# Patient Record
Sex: MALE | Race: WHITE
[De-identification: names, ages, dates, MRNs, and addresses within clinical notes are randomized per-mention and may not be internally consistent; named-entity substitution may affect disease eponyms.]

---

## 2021-04-22 ENCOUNTER — APPOINTMENT (OUTPATIENT)
Dept: CARDIOLOGY | Facility: CLINIC | Age: 61
End: 2021-04-22

## 2021-06-10 ENCOUNTER — RX RENEWAL (OUTPATIENT)
Age: 61
End: 2021-06-10

## 2022-04-21 ENCOUNTER — NON-APPOINTMENT (OUTPATIENT)
Age: 62
End: 2022-04-21

## 2022-04-21 ENCOUNTER — APPOINTMENT (OUTPATIENT)
Dept: CARDIOLOGY | Facility: CLINIC | Age: 62
End: 2022-04-21
Payer: COMMERCIAL

## 2022-04-21 VITALS
TEMPERATURE: 98.4 F | BODY MASS INDEX: 44.43 KG/M2 | HEIGHT: 69 IN | WEIGHT: 300 LBS | HEART RATE: 72 BPM | SYSTOLIC BLOOD PRESSURE: 148 MMHG | DIASTOLIC BLOOD PRESSURE: 70 MMHG | OXYGEN SATURATION: 95 %

## 2022-04-21 DIAGNOSIS — Z78.9 OTHER SPECIFIED HEALTH STATUS: ICD-10-CM

## 2022-04-21 DIAGNOSIS — Z82.49 FAMILY HISTORY OF ISCHEMIC HEART DISEASE AND OTHER DISEASES OF THE CIRCULATORY SYSTEM: ICD-10-CM

## 2022-04-21 DIAGNOSIS — Z72.3 LACK OF PHYSICAL EXERCISE: ICD-10-CM

## 2022-04-21 DIAGNOSIS — Z87.891 PERSONAL HISTORY OF NICOTINE DEPENDENCE: ICD-10-CM

## 2022-04-21 DIAGNOSIS — Z80.41 FAMILY HISTORY OF MALIGNANT NEOPLASM OF OVARY: ICD-10-CM

## 2022-04-21 DIAGNOSIS — Z86.2 PERSONAL HISTORY OF DISEASES OF THE BLOOD AND BLOOD-FORMING ORGANS AND CERTAIN DISORDERS INVOLVING THE IMMUNE MECHANISM: ICD-10-CM

## 2022-04-21 PROCEDURE — 99204 OFFICE O/P NEW MOD 45 MIN: CPT

## 2022-04-21 RX ORDER — METFORMIN HYDROCHLORIDE 1000 MG/1
1000 TABLET, COATED ORAL
Qty: 180 | Refills: 0 | Status: ACTIVE | COMMUNITY
Start: 2022-04-21

## 2022-04-21 RX ORDER — ASPIRIN ENTERIC COATED TABLETS 81 MG 81 MG/1
81 TABLET, DELAYED RELEASE ORAL DAILY
Qty: 30 | Refills: 0 | Status: ACTIVE | COMMUNITY
Start: 2022-04-21

## 2022-04-21 RX ORDER — CLOPIDOGREL BISULFATE 75 MG/1
75 TABLET, FILM COATED ORAL DAILY
Qty: 90 | Refills: 3 | Status: ACTIVE | COMMUNITY
Start: 2022-04-21

## 2022-04-21 RX ORDER — FLUTICASONE PROPIONATE AND SALMETEROL 50; 100 UG/1; UG/1
100-50 POWDER RESPIRATORY (INHALATION) TWICE DAILY
Refills: 0 | Status: ACTIVE | COMMUNITY
Start: 2022-04-21

## 2022-04-21 NOTE — DISCUSSION/SUMMARY
[FreeTextEntry1] : Patient with coronary atherosclerosis, diabetes, morbid obesity, hypertension, dyslipidemia ; with moderate left sided chest pain few days ago.  His EKG has sinus rhythm, flipped T wave in lead III.  The patient is unable to exercise vigorously given his morbid obesity: Further evaluation of his left-sided chest pain with nuclear stress test should be performed. \par \par Dyspnea on exertion, sleep apnea: Check echocardiogram.  Evaluation of pulmonary pressures should be important.\par \par Hypertension.  The patient does not want to increase metoprolol at this time.  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  Target LDL and A1c was also discussed.\par \par We will follow him after the above test results: \par \par Thank you for this referral and allowing me to participate in the care of this patient.  If I can be of any further help or  if you have any questions, please do not hesitate to contact me\par \par \par Sincerely,\par \par Edwin Gonzalez MD, FACC, [facility]

## 2022-04-21 NOTE — PHYSICAL EXAM
[Well Developed] : well developed [Well Nourished] : well nourished [No Acute Distress] : no acute distress [Normal Conjunctiva] : normal conjunctiva [Normal Venous Pressure] : normal venous pressure [No Carotid Bruit] : no carotid bruit [Normal S1, S2] : normal S1, S2 [No Murmur] : no murmur [No Rub] : no rub [No Gallop] : no gallop [Clear Lung Fields] : clear lung fields [Good Air Entry] : good air entry [No Respiratory Distress] : no respiratory distress  [Soft] : abdomen soft [Non Tender] : non-tender [Normal Gait] : normal gait [No Cyanosis] : no cyanosis [No Clubbing] : no clubbing [No Varicosities] : no varicosities [No Rash] : no rash [No Skin Lesions] : no skin lesions [Moves all extremities] : moves all extremities [No Focal Deficits] : no focal deficits [Alert and Oriented] : alert and oriented [Normal Speech] : normal speech [Normal memory] : normal memory [de-identified] : Uncle obesity [de-identified] : Subcutaneous fat.

## 2022-04-21 NOTE — REASON FOR VISIT
[FreeTextEntry1] : Danielito is a 61-year-old male with history of hypertension, dyslipidemia, morbid obesity, diabetes, obstructive sleep apnea on CPAP.\par \par On 4/16/2022, he had left upper chest discomfort which was sharp in character, localized, 5/10 in intensity, started spontaneously and then resolved.  He was seen in the ER in [place] and a CTA of coronaries was performed and he was informed that he had about 50% blockages.  Cardiac cath or PCI was not pursued.  Since then, he has returned from [place] and has not had any further cardiac symptoms.\par \par He has morbid obesity, obstructive sleep apnea, mild dyspnea on exertion which has been stable and unchanged over the years, no PND or orthopnea, no palpitations or syncope.\par \par In 2016 he had cardiac cath which was nonobstructive and was not pursued with any intervention.\par \par Blood pressures are not ideally controlled today.  The patient feels that he started metoprolol only few days ago and may not have had its full impact

## 2022-04-26 ENCOUNTER — APPOINTMENT (OUTPATIENT)
Dept: CARDIOLOGY | Facility: CLINIC | Age: 62
End: 2022-04-26
Payer: COMMERCIAL

## 2022-04-26 PROCEDURE — 93306 TTE W/DOPPLER COMPLETE: CPT

## 2022-04-26 PROCEDURE — 93015 CV STRESS TEST SUPVJ I&R: CPT

## 2022-04-26 PROCEDURE — A9502: CPT

## 2022-04-26 PROCEDURE — ZZZZZ: CPT

## 2022-04-26 PROCEDURE — 78452 HT MUSCLE IMAGE SPECT MULT: CPT

## 2022-04-27 ENCOUNTER — APPOINTMENT (OUTPATIENT)
Dept: CARDIOLOGY | Facility: CLINIC | Age: 62
End: 2022-04-27
Payer: COMMERCIAL

## 2022-04-27 VITALS
HEIGHT: 69 IN | TEMPERATURE: 98 F | DIASTOLIC BLOOD PRESSURE: 72 MMHG | WEIGHT: 299 LBS | BODY MASS INDEX: 44.28 KG/M2 | OXYGEN SATURATION: 97 % | SYSTOLIC BLOOD PRESSURE: 148 MMHG | HEART RATE: 72 BPM

## 2022-04-27 PROCEDURE — 99214 OFFICE O/P EST MOD 30 MIN: CPT

## 2022-04-27 RX ORDER — METOPROLOL TARTRATE 25 MG/1
25 TABLET, FILM COATED ORAL TWICE DAILY
Qty: 180 | Refills: 1 | Status: ACTIVE | COMMUNITY
Start: 2022-04-21 | End: 1900-01-01

## 2022-04-27 NOTE — DISCUSSION/SUMMARY
[FreeTextEntry1] : Patient with noncritical coronary atherosclerosis, diabetes, morbid obesity, hypertension, dyslipidemia ; with moderate left sided chest pain few days ago.  \par \par I discussed echocardiogram and nuclear stress test with SPECT perfusion imaging findings in detail with him.\par \par Dyspnea on exertion, sleep apnea: Most likely from physical conditioning and obesity.  LV systolic and diastolic function as well as PASP are normal\par \par Hypertension.  Increase metoprolol to 25 mg twice daily\par \par Continue dual antiplatelet treatment and diabetes medications as well as rosuvastatin without changes\par \par \par Thank you for this referral and allowing me to participate in the care of this patient.  If I can be of any further help or  if you have any questions, please do not hesitate to contact me\par \par \par Sincerely,\par \par Edwin Gonzalez MD, FACC, [place]

## 2022-04-27 NOTE — PHYSICAL EXAM
[Well Developed] : well developed [Well Nourished] : well nourished [No Acute Distress] : no acute distress [Normal Conjunctiva] : normal conjunctiva [Normal Venous Pressure] : normal venous pressure [No Carotid Bruit] : no carotid bruit [Normal S1, S2] : normal S1, S2 [No Murmur] : no murmur [No Rub] : no rub [No Gallop] : no gallop [Clear Lung Fields] : clear lung fields [Good Air Entry] : good air entry [No Respiratory Distress] : no respiratory distress  [Soft] : abdomen soft [Non Tender] : non-tender [Normal Gait] : normal gait [No Cyanosis] : no cyanosis [No Clubbing] : no clubbing [No Varicosities] : no varicosities [No Rash] : no rash [No Skin Lesions] : no skin lesions [Moves all extremities] : moves all extremities [No Focal Deficits] : no focal deficits [Normal Speech] : normal speech [Alert and Oriented] : alert and oriented [Normal memory] : normal memory [de-identified] : Uncle obesity [de-identified] : Subcutaneous fat.

## 2022-04-27 NOTE — REASON FOR VISIT
[FreeTextEntry1] : Danielito is a 61-year-old male with history of hypertension, dyslipidemia, morbid obesity, diabetes, obstructive sleep apnea on CPAP.\par \par On 4/16/2022, he had left upper chest discomfort which was sharp in character, localized, 5/10 in intensity, started spontaneously and then resolved.  He was seen in the ER in [place] and a CTA of coronaries was performed and he was informed that he had less than  50% narrowing in proximal LAD -which was confirmed by obtaining the CTA report from [place].  Cardiac cath or PCI was not pursued.\par \par He had echo and nuclear stress testing here.  LV function was normal.  SPECT scan did not show any ischemia or significant fixed defects.  He no longer has any chest pain\par \par He has morbid obesity, obstructive sleep apnea, mild dyspnea on exertion which has been stable and unchanged over the years, no PND or orthopnea, no palpitations or syncope.\par \par In 2016 he had cardiac cath which was nonobstructive and was not pursued with any intervention.\par \par Blood pressures are not ideally controlled today.  He has not been taking metoprolol twice daily.  He takes it once a day only.

## 2022-04-28 ENCOUNTER — APPOINTMENT (OUTPATIENT)
Dept: CARDIOLOGY | Facility: CLINIC | Age: 62
End: 2022-04-28

## 2022-10-24 ENCOUNTER — APPOINTMENT (OUTPATIENT)
Dept: CARDIOLOGY | Facility: CLINIC | Age: 62
End: 2022-10-24

## 2022-10-24 VITALS
RESPIRATION RATE: 14 BRPM | SYSTOLIC BLOOD PRESSURE: 120 MMHG | TEMPERATURE: 97.1 F | BODY MASS INDEX: 45.47 KG/M2 | WEIGHT: 307 LBS | OXYGEN SATURATION: 96 % | HEIGHT: 69 IN | HEART RATE: 63 BPM | DIASTOLIC BLOOD PRESSURE: 68 MMHG

## 2022-10-24 PROCEDURE — 99214 OFFICE O/P EST MOD 30 MIN: CPT

## 2022-10-24 RX ORDER — FAMOTIDINE 40 MG/1
40 TABLET, FILM COATED ORAL
Qty: 90 | Refills: 0 | Status: ACTIVE | COMMUNITY
Start: 2022-07-25

## 2022-10-24 RX ORDER — ROSUVASTATIN CALCIUM 5 MG/1
5 TABLET, FILM COATED ORAL
Qty: 90 | Refills: 0 | Status: ACTIVE | COMMUNITY
Start: 2022-08-30

## 2022-10-24 RX ORDER — FLUTICASONE PROPIONATE 50 UG/1
50 SPRAY, METERED NASAL
Qty: 16 | Refills: 0 | Status: ACTIVE | COMMUNITY
Start: 2022-08-30

## 2022-10-24 RX ORDER — ROSUVASTATIN CALCIUM 10 MG/1
10 TABLET, FILM COATED ORAL
Qty: 90 | Refills: 1 | Status: DISCONTINUED | COMMUNITY
Start: 2022-04-21 | End: 2022-10-24

## 2022-10-24 NOTE — REASON FOR VISIT
[FreeTextEntry1] : Danielito is a 61-year-old male with history of hypertension, dyslipidemia, morbid obesity, diabetes, obstructive sleep apnea on CPAP.\par \par Atypical chest pain in April 2022 and CT of coronaries showed he had less than  50% narrowing in proximal LAD.  Cardiac cath or PCI was not pursued.\par \par He had echo and nuclear stress testing here.  LV function was normal.  SPECT scan did not show any ischemia or significant fixed defects.  He no longer has any chest pain\par \par He has morbid obesity, obstructive sleep apnea, mild dyspnea on exertion which has been stable and unchanged over the years, no PND or orthopnea, no palpitations or syncope.\par \par In 2016 he had cardiac cath which was nonobstructive and was not pursued with any intervention.\par \par Blood pressures are controlled today.  LDL was 58 in April 2022

## 2022-10-24 NOTE — DISCUSSION/SUMMARY
[FreeTextEntry1] : Patient with noncritical coronary atherosclerosis, diabetes, morbid obesity, hypertension, dyslipidemia ; with moderate left sided chest pain few days ago.  \par \par Lipidemia: On low-dose Crestor, LDL is 58.  In addition, dietary changes and exercise program would be helpful which was discussed in detail\par \par Dyspnea on exertion, sleep apnea: Most likely from physical conditioning and obesity.  LV systolic and diastolic function as well as PASP are normal\par \par Hypertension.  Continue current medications\par \par Continue dual antiplatelet treatment and diabetes medications as well as rosuvastatin without changes\par \par \par Thank you for this referral and allowing me to participate in the care of this patient.  If I can be of any further help or  if you have any questions, please do not hesitate to contact me\par \par \par Sincerely,\par \par Edwin Gonzalez MD, FACC, [place]

## 2023-01-15 ENCOUNTER — NON-APPOINTMENT (OUTPATIENT)
Age: 63
End: 2023-01-15

## 2023-04-13 ENCOUNTER — APPOINTMENT (OUTPATIENT)
Dept: CARDIOLOGY | Facility: CLINIC | Age: 63
End: 2023-04-13

## 2023-04-27 ENCOUNTER — NON-APPOINTMENT (OUTPATIENT)
Age: 63
End: 2023-04-27

## 2023-04-27 ENCOUNTER — APPOINTMENT (OUTPATIENT)
Dept: CARDIOLOGY | Facility: CLINIC | Age: 63
End: 2023-04-27
Payer: COMMERCIAL

## 2023-04-27 VITALS
HEART RATE: 59 BPM | OXYGEN SATURATION: 96 % | WEIGHT: 309 LBS | HEIGHT: 69 IN | BODY MASS INDEX: 45.77 KG/M2 | SYSTOLIC BLOOD PRESSURE: 110 MMHG | DIASTOLIC BLOOD PRESSURE: 68 MMHG

## 2023-04-27 DIAGNOSIS — Z87.898 PERSONAL HISTORY OF OTHER SPECIFIED CONDITIONS: ICD-10-CM

## 2023-04-27 PROCEDURE — 99214 OFFICE O/P EST MOD 30 MIN: CPT

## 2023-04-27 RX ORDER — LINAGLIPTIN 5 MG/1
5 TABLET, FILM COATED ORAL DAILY
Refills: 0 | Status: DISCONTINUED | COMMUNITY
Start: 2022-04-21 | End: 2023-04-27

## 2023-04-27 NOTE — PHYSICAL EXAM
[Well Developed] : well developed [Well Nourished] : well nourished [No Acute Distress] : no acute distress [Normal Conjunctiva] : normal conjunctiva [Normal Venous Pressure] : normal venous pressure [No Carotid Bruit] : no carotid bruit [Normal S1, S2] : normal S1, S2 [No Murmur] : no murmur [No Rub] : no rub [No Gallop] : no gallop [Clear Lung Fields] : clear lung fields [Good Air Entry] : good air entry [No Respiratory Distress] : no respiratory distress  [Soft] : abdomen soft [Non Tender] : non-tender [Normal Gait] : normal gait [No Cyanosis] : no cyanosis [No Clubbing] : no clubbing [No Varicosities] : no varicosities [No Rash] : no rash [No Skin Lesions] : no skin lesions [Moves all extremities] : moves all extremities [No Focal Deficits] : no focal deficits [Normal Speech] : normal speech [Alert and Oriented] : alert and oriented [Normal memory] : normal memory [de-identified] : Uncle obesity [de-identified] : Subcutaneous fat.

## 2023-04-27 NOTE — DISCUSSION/SUMMARY
[FreeTextEntry1] : Patient with noncritical coronary atherosclerosis, diabetes, morbid obesity, hypertension, dyslipidemia ; no chest pain.  EKG today with unremarkable ST and T\par \par Hyper lipidemia: On low-dose Crestor, LDL is 65.  In addition, dietary changes and exercise program would be helpful which was discussed in detail\par \par Dyspnea on exertion, sleep apnea: Most likely from physical conditioning and obesity.  LV systolic and diastolic function as well as PASP are normal\par \par Hypertension.  Controlled and continue current medications\par \par Continue dual antiplatelet treatment and diabetes medications as well as rosuvastatin without changes\par \par Morbid obesity with uncontrolled diabetes.  A1c 7.7.  Stop Tradjenta.  Try Ozempic.  Weight loss may have multiple benefits.  Risk and benefits discussed.  The patient will try it for 2 weeks and check labs.  Follow-up after.  Increase the dosage of Ozempic depending upon his response.\par \par \par Thank you for this referral and allowing me to participate in the care of this patient.  If I can be of any further help or  if you have any questions, please do not hesitate to contact me\par \par \par Sincerely,\par \par Edwin Gonzalez MD, FACC, [place]

## 2023-04-27 NOTE — REASON FOR VISIT
[FreeTextEntry1] : Danielito is a 62-year-old male with history of hypertension, dyslipidemia, morbid obesity, diabetes, obstructive sleep apnea on CPAP.\par \par Coronary atherosclerosis: CT of coronaries showed he had less than  50% narrowing in proximal LAD.  Cardiac cath or PCI was not pursued.\par \par He had echo and nuclear stress testing here.  LV function was normal.  SPECT scan did not show any ischemia or significant fixed defects.  He no longer has any chest pain\par \par He has morbid obesity, obstructive sleep apnea, mild dyspnea on exertion which has been stable and unchanged over the years, no PND or orthopnea, no palpitations or syncope.\par \par In 2016 he had cardiac cath which was nonobstructive and was not pursued with any intervention.\par \par Blood pressures are controlled today.  LDL was 58 in April 2022\par \par March 2023 Labs: Showed A1c of 7.7 and FBS of 156

## 2023-05-08 ENCOUNTER — APPOINTMENT (OUTPATIENT)
Dept: CARDIOLOGY | Facility: CLINIC | Age: 63
End: 2023-05-08
Payer: COMMERCIAL

## 2023-05-08 VITALS
WEIGHT: 304 LBS | BODY MASS INDEX: 45.03 KG/M2 | HEIGHT: 69 IN | HEART RATE: 96 BPM | OXYGEN SATURATION: 81 % | SYSTOLIC BLOOD PRESSURE: 126 MMHG | DIASTOLIC BLOOD PRESSURE: 60 MMHG

## 2023-05-08 PROCEDURE — 99214 OFFICE O/P EST MOD 30 MIN: CPT

## 2023-05-08 NOTE — HISTORY OF PRESENT ILLNESS
[FreeTextEntry1] : SVETLANA FARMER is a 62 year old male with a past medical history of:\par \par  hypertension, dyslipidemia, morbid obesity, diabetes, obstructive sleep apnea on CPAP.\par \par Coronary atherosclerosis: CT of coronaries 4/15/22 showed he had less than 50% narrowing in proximal LAD. Cardiac cath or PCI was not pursued. Calcified plaque also present in the left main coronary artery. EF 68%. Small 3mm nodule seen posterior segment right lower lobe.\par \par He had echo and nuclear stress testing here 4/26/22. LV function was normal. SPECT scan did not show any ischemia or significant fixed defects. He no longer has any chest pain\par \par In 2016 he had cardiac cath which was nonobstructive and was not pursued with any intervention.\par \par \par \par Last seen 4/27/23. Tradjenta stopped. Ozempic started. Presents today for evaluation. He has no side effects and has lost 5lbs. In the interim there have been no hospitalizations or procedures. Reports indigestion and BRADSHAW. There is trace BLLE edema on exam. Denies palpitations, dizziness, lightheadedness, syncope, near syncope, and claudication. Remote smoking hx. Not on a formal exercise regimen, but walks frequently at work.\par \par \par Testing:\par \par Labs 5/8/23: ALT 50, AST 29\par \par Nuke 4/2022 as documented above.\par \par CT Cors 4/2022 as documented above.\par \par Echo 4/26/22: EF 55-60%. Minimal MR. Mild concentric LVH. Endocardium NWV; grossly nroaml LVSF. Beats of ectopy noted. \par \par Labs 3/20/23: Na 140, K 4.5, Cr 0.94, Ca 9.5, AST 30, ALT 47, Trigs 137, HDL 37, LDL 65, Chol 129, TSH 1.78,k A1C 7.7, PSA 0.53, WBC 8.46, Hgb 14.7, HCT 44.3, [id number]\par

## 2023-05-08 NOTE — PHYSICAL EXAM

## 2023-05-08 NOTE — DISCUSSION/SUMMARY
[FreeTextEntry1] : SVETLANA FARMER is a 62 year old M who presents today May 08, 2023 with the above history and the following active issues:\par \par Patient with noncritical coronary atherosclerosis, diabetes, morbid obesity, hypertension, dyslipidemia. Now with indigestion and BRADSHAW. Recommend updating echo and nuke. Note echo 4/2022 was poor study, so will perform next one with Lumason.\par \par Hyper lipidemia: On low-dose Crestor. In addition, dietary changes and exercise program would be helpful which was discussed in detail.\par \par Hypertension. Controlled and continue current medications\par \par Continue dual antiplatelet treatment and diabetes medications as well as rosuvastatin without changes\par \par Morbid obesity with uncontrolled diabetes. A1c 7.7. Continue Ozempic. Weight loss may have multiple benefits. Risk and benefits discussed.  Follow-up after testing. Increase the dosage of Ozempic depending upon his response. He has only been on it two weeks so will not increase at this time.\par \par Continue Rosuvastatin 5mg daily, Plavix 75mg daily, Metoprolol Tartrate 25mg BID, and ASA 81mg daily.\par \par F/u with PCP re: noncardiac findings on CT4/15/22. Incidental finding of lung nodules noted. Made patient aware.\par Ongoing f/u with PCP.\par \par F/U after testing to review results (Echo with Lumason and nuke).\par Discussed red flag symptoms, which would warrant sooner or emergent medical evaluation.\par Any questions and concerns were addressed and resolved.\par \par Sincerely,\par Ammy Gomez [facility]\par Patient's history, testing, and plan was reviewed with supervising physician, Dr. Patrick Valentino\par \par

## 2023-05-30 ENCOUNTER — APPOINTMENT (OUTPATIENT)
Dept: CARDIOLOGY | Facility: CLINIC | Age: 63
End: 2023-05-30
Payer: COMMERCIAL

## 2023-05-30 PROCEDURE — A9502: CPT

## 2023-05-30 PROCEDURE — 93015 CV STRESS TEST SUPVJ I&R: CPT

## 2023-05-30 PROCEDURE — 93306 TTE W/DOPPLER COMPLETE: CPT

## 2023-05-30 PROCEDURE — 78452 HT MUSCLE IMAGE SPECT MULT: CPT

## 2023-06-02 NOTE — REVIEW OF SYSTEMS
normal mood with appropriate affect [Negative] : Heme/Lymph [Dyspnea on exertion] : dyspnea during exertion [Chest Discomfort] : chest discomfort [Lower Ext Edema] : lower extremity edema [FreeTextEntry5] : indigestion per HPI

## 2023-06-08 ENCOUNTER — APPOINTMENT (OUTPATIENT)
Dept: CARDIOLOGY | Facility: CLINIC | Age: 63
End: 2023-06-08
Payer: COMMERCIAL

## 2023-06-08 VITALS
BODY MASS INDEX: 45.03 KG/M2 | HEART RATE: 80 BPM | OXYGEN SATURATION: 96 % | DIASTOLIC BLOOD PRESSURE: 64 MMHG | WEIGHT: 304 LBS | HEIGHT: 69 IN | SYSTOLIC BLOOD PRESSURE: 130 MMHG

## 2023-06-08 PROCEDURE — 99214 OFFICE O/P EST MOD 30 MIN: CPT

## 2023-06-08 NOTE — DISCUSSION/SUMMARY
[FreeTextEntry1] : SVETLANA FARMER is a 62 year old M who presents today Jun 08, 2023 with the above history and the following active issues:\par \par Patient with noncritical coronary atherosclerosis, diabetes, morbid obesity, hypertension, dyslipidemia.\par \par Echo 5/2023 with EF 55-60%. PASP 16mmHg. Nuke 5/2023 negative for ischemia. BRADSHAW likely deconditioning. Increase Ozempic doseage and reassess patient in 1.5 months to see if weight reduction helps breathing.\par \par Hyper lipidemia: On low-dose Crestor. In addition, dietary changes and exercise program would be helpful which was discussed in detail.\par \par Hypertension. Controlled and continue current medications\par \par Continue dual antiplatelet treatment and diabetes medications.\par \par Morbid obesity with uncontrolled diabetes. A1c 7.7. Continue Ozempic.\par \par Continue Rosuvastatin 5mg daily, Plavix 75mg daily, Metoprolol Tartrate 25mg BID, and ASA 81mg daily.\par \par F/u with PCP re: noncardiac findings on CT4/15/22. Incidental finding of lung nodules noted. Made patient aware.\par Ongoing f/u with PCP.\par \par F/U 1-2 months for reassessment of weight and BRADSHAW.\par Discussed red flag symptoms, which would warrant sooner or emergent medical evaluation.\par Any questions and concerns were addressed and resolved.\par \par Sincerely,\par Ammy Gomez [facility]\par Patient's history, testing, and plan was reviewed with supervising physician, Dr. Edwin Gonzalez\par \par

## 2023-06-08 NOTE — HISTORY OF PRESENT ILLNESS
[FreeTextEntry1] : SVETLANA FARMER is a 62 year old male with a past medical history of:\par \par  hypertension, dyslipidemia, morbid obesity, diabetes, obstructive sleep apnea on CPAP.\par \par Coronary atherosclerosis: CT of coronaries 4/15/22 showed he had less than 50% narrowing in proximal LAD. Cardiac cath or PCI was not pursued. Calcified plaque also present in the left main coronary artery. EF 68%. Small 3mm nodule seen posterior segment right lower lobe.\par \par He had echo and nuclear stress testing here 4/26/22. LV function was normal. SPECT scan did not show any ischemia or significant fixed defects. He no longer has any chest pain\par \par In 2016 he had cardiac cath which was nonobstructive and was not pursued with any intervention.\par \par \par Last seen 5/8/23. Echo with Lumason and nuke ordered. See details below. Lumason not given due to questionable allergic reaction ?anaphylaxis. Reports BRADSHAW. He denies chest pain, pressure, palpitations, orthopnea, LE edema, lightheadedness, dizziness, near syncope or syncope. Remote smoking hx. Not on a formal exercise regimen, but walks frequently at work.\par \par \par Testing:\par \par Echo 5/30/23: CONCLUSIONS:\par 1. The left ventricular systolic function is normal with an ejection fraction visually estimated at 55 to 60\par %.\par 2. There is normal left ventricular diastolic function.\par 3. All 3 leaflets are not well visualized, appears trileaflet and thickened.\par 4. Mild mitral regurgitation.\par 5. Trace tricuspid regurgitation.\par 6. Estimated pulmonary artery systolic pressure is 16 mmHg.\par 7. No pericardial effusion seen.\par 8. Normal pericardium.\par 9. Compared to prior on 04/2022- no significant changes.\par 10. Technically difficult image quality.\par _________________________________\par \par Nuke 5/30/23: Conclusions:\par 1. Stress electrocardiogram: No significant ischemic ST segment changes beyond baseline\par abnormalities.\par 2. No evidence of ischemia by EKG\par 3. Qualitative Perfusion:\par - small-sized, mild defect(s) in the apical inferior wall that is fixed, predominantly corrects with prone\par imaging suggestive of diaphragmatic attenuation artifact.\par 4. The resting left ventricular EF% is 55 %.\par 5. Myocardial Perfusion: Probably Normal.\par -------------------------------------------------------------------------------------------------------------------------------\par \par Labs 5/8/23: ALT 50, AST 29\par \par Nuke 4/2022 as documented above.\par \par CT Cors 4/2022 as documented above.\par \par Echo 4/26/22: EF 55-60%. Minimal MR. Mild concentric LVH. Endocardium NWV; grossly nroaml LVSF. Beats of ectopy noted. \par \par Labs 3/20/23: Na 140, K 4.5, Cr 0.94, Ca 9.5, AST 30, ALT 47, Trigs 137, HDL 37, LDL 65, Chol 129, TSH 1.78,k A1C 7.7, PSA 0.53, WBC 8.46, Hgb 14.7, HCT 44.3, [id number]\par

## 2023-08-17 ENCOUNTER — APPOINTMENT (OUTPATIENT)
Dept: CARDIOLOGY | Facility: CLINIC | Age: 63
End: 2023-08-17
Payer: COMMERCIAL

## 2023-08-17 VITALS
HEIGHT: 69 IN | OXYGEN SATURATION: 99 % | DIASTOLIC BLOOD PRESSURE: 78 MMHG | SYSTOLIC BLOOD PRESSURE: 136 MMHG | BODY MASS INDEX: 45.18 KG/M2 | HEART RATE: 65 BPM | RESPIRATION RATE: 14 BRPM | WEIGHT: 305 LBS

## 2023-08-17 PROCEDURE — 99214 OFFICE O/P EST MOD 30 MIN: CPT

## 2023-08-17 RX ORDER — SEMAGLUTIDE 1.34 MG/ML
4 INJECTION, SOLUTION SUBCUTANEOUS
Qty: 4 | Refills: 3 | Status: ACTIVE | COMMUNITY
Start: 2023-04-27 | End: 1900-01-01

## 2023-08-17 NOTE — HISTORY OF PRESENT ILLNESS
[FreeTextEntry1] : SVETLANA FARMER is a 62 year old male with a past medical history of:   hypertension, dyslipidemia, morbid obesity, diabetes, obstructive sleep apnea on CPAP.  Coronary atherosclerosis: CT of coronaries 4/15/22 showed he had less than 50% narrowing in proximal LAD. Cardiac cath or PCI was not pursued. Calcified plaque also present in the left main coronary artery. EF 68%. Small 3mm nodule seen posterior segment right lower lobe.  He had echo and nuclear stress testing here 4/26/22. LV function was normal. SPECT scan did not show any ischemia or significant fixed defects. He no longer has any chest pain  In 2016 he had cardiac cath which was nonobstructive and was not pursued with any intervention.  Lumason not given due to questionable allergic reaction ?anaphylaxis.   Reports BRADSHAW. He denies chest pain, pressure, palpitations, orthopnea, LE edema, lightheadedness, dizziness, near syncope or syncope. Remote smoking hx. Not on a formal exercise regimen, but walks frequently at work.   Testing:  Echo 5/30/23: CONCLUSIONS: 1. The left ventricular systolic function is normal with an ejection fraction visually estimated at 55 to 60 %. 2. There is normal left ventricular diastolic function. 3. All 3 leaflets are not well visualized, appears trileaflet and thickened. 4. Mild mitral regurgitation. 5. Trace tricuspid regurgitation. 6. Estimated pulmonary artery systolic pressure is 16 mmHg. 7. No pericardial effusion seen. 8. Normal pericardium. 9. Compared to prior on 04/2022- no significant changes. 10. Technically difficult image quality. _________________________________  Nuke 5/30/23: Conclusions: 1. Stress electrocardiogram: No significant ischemic ST segment changes beyond baseline abnormalities. 2. No evidence of ischemia by EKG 3. Qualitative Perfusion: - small-sized, mild defect(s) in the apical inferior wall that is fixed, predominantly corrects with prone imaging suggestive of diaphragmatic attenuation artifact. 4. The resting left ventricular EF% is 55 %. 5. Myocardial Perfusion: Probably Normal. -------------------------------------------------------------------------------------------------------------------------------  Labs 5/8/23: ALT 50, AST 29  Nuke 4/2022 as documented above.  CT Cors 4/2022 as documented above.  Echo 4/26/22: EF 55-60%. Minimal MR. Mild concentric LVH. Endocardium NWV; grossly nroaml LVSF. Beats of ectopy noted.   Labs 3/20/23: Na 140, K 4.5, Cr 0.94, Ca 9.5, AST 30, ALT 47, Trigs 137, HDL 37, LDL 65, Chol 129, TSH 1.78,k A1C 7.7, PSA 0.53, WBC 8.46, Hgb 14.7, HCT 44.3, [id number]

## 2023-08-17 NOTE — PHYSICAL EXAM
[Well Developed] : well developed [Well Nourished] : well nourished [Normal Conjunctiva] : normal conjunctiva [Normal Venous Pressure] : normal venous pressure [No Carotid Bruit] : no carotid bruit [Normal S1, S2] : normal S1, S2 [No Murmur] : no murmur [No Rub] : no rub [No Gallop] : no gallop [Clear Lung Fields] : clear lung fields [Good Air Entry] : good air entry [No Respiratory Distress] : no respiratory distress  [Soft] : abdomen soft [Non Tender] : non-tender [No Masses/organomegaly] : no masses/organomegaly [Normal Bowel Sounds] : normal bowel sounds [Normal Gait] : normal gait [No Edema] : no edema [No Cyanosis] : no cyanosis [No Clubbing] : no clubbing [No Varicosities] : no varicosities [No Rash] : no rash [No Skin Lesions] : no skin lesions [Moves all extremities] : moves all extremities [No Focal Deficits] : no focal deficits [Normal Speech] : normal speech [Alert and Oriented] : alert and oriented [Normal memory] : normal memory

## 2023-08-17 NOTE — DISCUSSION/SUMMARY
[FreeTextEntry1] : SVETLANA FARMER is a 62 year old M   Patient with noncritical coronary atherosclerosis, diabetes, morbid obesity, hypertension, dyslipidemia.  Echo 5/2023 with EF 55-60%. PASP 16mmHg. Nuke 5/2023 negative for ischemia. BRADSHAW likely deconditioning. Increase Ozempic doseage and reassess patient in 1.5 months to see if weight reduction helps breathing.  Hyper lipidemia: On low-dose Crestor. In addition, dietary changes and exercise program would be helpful which was discussed in detail.  Hypertension. Controlled and continue current medications  Continue dual antiplatelet treatment and diabetes medications.  Morbid obesity with uncontrolled diabetes. A1c 7.7. Continue Ozempic at higher dose.  Continue Rosuvastatin 5mg daily, Plavix 75mg daily, Metoprolol Tartrate 25mg BID, and ASA 81mg daily.  Ongoing f/u with PCP.  Discussed red flag symptoms, which would warrant sooner or emergent medical evaluation. Any questions and concerns were addressed and resolved.  Sincerely, Ammy Gomez [facility] Patient's history, testing, and plan was reviewed with supervising physician, Dr. Edwin Gonzalez

## 2023-12-10 ENCOUNTER — NON-APPOINTMENT (OUTPATIENT)
Age: 63
End: 2023-12-10

## 2024-03-14 ENCOUNTER — APPOINTMENT (OUTPATIENT)
Dept: CARDIOLOGY | Facility: CLINIC | Age: 64
End: 2024-03-14
Payer: COMMERCIAL

## 2024-03-14 VITALS
SYSTOLIC BLOOD PRESSURE: 128 MMHG | OXYGEN SATURATION: 97 % | BODY MASS INDEX: 43.99 KG/M2 | HEART RATE: 78 BPM | HEIGHT: 69 IN | RESPIRATION RATE: 14 BRPM | WEIGHT: 297 LBS | DIASTOLIC BLOOD PRESSURE: 64 MMHG

## 2024-03-14 DIAGNOSIS — E66.01 MORBID (SEVERE) OBESITY DUE TO EXCESS CALORIES: ICD-10-CM

## 2024-03-14 DIAGNOSIS — I25.10 ATHEROSCLEROTIC HEART DISEASE OF NATIVE CORONARY ARTERY W/OUT ANGINA PECTORIS: ICD-10-CM

## 2024-03-14 DIAGNOSIS — R06.09 OTHER FORMS OF DYSPNEA: ICD-10-CM

## 2024-03-14 DIAGNOSIS — I10 ESSENTIAL (PRIMARY) HYPERTENSION: ICD-10-CM

## 2024-03-14 DIAGNOSIS — E78.5 HYPERLIPIDEMIA, UNSPECIFIED: ICD-10-CM

## 2024-03-14 DIAGNOSIS — E11.9 TYPE 2 DIABETES MELLITUS W/OUT COMPLICATIONS: ICD-10-CM

## 2024-03-14 DIAGNOSIS — G47.33 OBSTRUCTIVE SLEEP APNEA (ADULT) (PEDIATRIC): ICD-10-CM

## 2024-03-14 PROCEDURE — G2211 COMPLEX E/M VISIT ADD ON: CPT

## 2024-03-14 PROCEDURE — 99214 OFFICE O/P EST MOD 30 MIN: CPT

## 2024-03-14 RX ORDER — HYDROXYZINE PAMOATE 25 MG/1
25 CAPSULE ORAL
Qty: 60 | Refills: 0 | Status: DISCONTINUED | COMMUNITY
Start: 2022-08-25 | End: 2024-03-14

## 2024-03-14 RX ORDER — LOSARTAN POTASSIUM 50 MG/1
50 TABLET, FILM COATED ORAL DAILY
Qty: 90 | Refills: 3 | Status: DISCONTINUED | COMMUNITY
End: 2024-03-14

## 2024-03-14 RX ORDER — LOSARTAN POTASSIUM 100 MG/1
100 TABLET, FILM COATED ORAL DAILY
Refills: 0 | Status: ACTIVE | COMMUNITY

## 2024-03-14 NOTE — PHYSICAL EXAM
[Well Developed] : well developed [Normal Conjunctiva] : normal conjunctiva [Well Nourished] : well nourished [No Carotid Bruit] : no carotid bruit [Normal Venous Pressure] : normal venous pressure [Normal S1, S2] : normal S1, S2 [No Murmur] : no murmur [No Gallop] : no gallop [No Rub] : no rub [Clear Lung Fields] : clear lung fields [Good Air Entry] : good air entry [No Respiratory Distress] : no respiratory distress  [Soft] : abdomen soft [Non Tender] : non-tender [Normal Gait] : normal gait [No Cyanosis] : no cyanosis [No Edema] : no edema [No Clubbing] : no clubbing [No Varicosities] : no varicosities [No Rash] : no rash [No Skin Lesions] : no skin lesions [Moves all extremities] : moves all extremities [No Focal Deficits] : no focal deficits [Alert and Oriented] : alert and oriented [Normal Speech] : normal speech [Normal memory] : normal memory [de-identified] : Obese

## 2024-03-14 NOTE — DISCUSSION/SUMMARY
[FreeTextEntry1] : SVETLANA FARMER is a 63 year old M   Patient with noncritical coronary atherosclerosis, diabetes, morbid obesity, hypertension, dyslipidemia.  Asymptomatic for CAD  Echo 5/2023 with EF 55-60%. PASP 16mmHg. Nuke 5/2023 negative for ischemia. BRADSHAW likely deconditioning.  History of CHF the patient has risks for it  Hyper lipidemia: On low-dose Crestor. In addition, dietary changes and exercise program would be helpful which was discussed in detail.  LDL was 65 in March 2023  Hypertension. Controlled and continue current medications  Continue dual antiplatelet treatment and diabetes medications.  Morbid obesity with  diabetes. A1c 7.7. Continue Ozempic at higher dose.  Aggressive lifestyle changes will also be needed since Ozempic has not made a huge difference even at the higher dose  Continue Rosuvastatin 5mg daily, Plavix 75mg daily, Metoprolol Tartrate 25mg BID, and ASA 81mg daily.  Ongoing f/u with PCP.  Thank you for this referral and allowing me to participate in the care of this patient.  If I can be of any further help or  if you have any questions, please do not hesitate to contact me   Sincerely,  Edwin Gonzalez MD, FACC, BARNEY

## 2024-03-14 NOTE — HISTORY OF PRESENT ILLNESS
[FreeTextEntry1] : SVETLANA FARMER is a 63 year old male with a past medical history of:   hypertension, dyslipidemia, morbid obesity, diabetes, obstructive sleep apnea on CPAP.  Coronary atherosclerosis: CT of coronaries 4/15/22 showed he had less than 50% narrowing in proximal LAD. Cardiac cath or PCI was not pursued. Calcified plaque also present in the left main coronary artery. EF 68%. Small 3mm nodule seen posterior segment right lower lobe.  Asymptomatic for CAD  He had echo and nuclear stress testing here 4/26/22. LV function was normal. SPECT scan did not show any ischemia or significant fixed defects. He no longer has any chest pain  In 2016 he had cardiac cath which was nonobstructive and was not pursued with any intervention.  Lumason not given due to questionable allergic reaction ?anaphylaxis.   Reports BRADSHAW. He denies chest pain, pressure, palpitations, orthopnea, LE edema, lightheadedness, dizziness, near syncope or syncope. Remote smoking hx. Not on a formal exercise regimen, but walks frequently at work.   Testing:  Echo 5/30/23: CONCLUSIONS: 1. The left ventricular systolic function is normal with an ejection fraction visually estimated at 55 to 60 %. 2. There is normal left ventricular diastolic function. 3. All 3 leaflets are not well visualized, appears trileaflet and thickened. 4. Mild mitral regurgitation. 5. Trace tricuspid regurgitation. 6. Estimated pulmonary artery systolic pressure is 16 mmHg. 7. No pericardial effusion seen. 8. Normal pericardium. 9. Compared to prior on 04/2022- no significant changes. 10. Technically difficult image quality. _________________________________  [facility] 5/30/23: Conclusions: 1. Stress electrocardiogram: No significant ischemic ST segment changes beyond baseline abnormalities. 2. No evidence of ischemia by EKG 3. Qualitative Perfusion: - small-sized, mild defect(s) in the apical inferior wall that is fixed, predominantly corrects with prone imaging suggestive of diaphragmatic attenuation artifact. 4. The resting left ventricular EF% is 55 %. 5. Myocardial Perfusion: Probably Normal. -------------------------------------------------------------------------------------------------------------------------------  Labs 5/8/23: ALT 50, AST 29  Nuke 4/2022 as documented above.  CT Cors 4/2022 as documented above.  Echo 4/26/22: EF 55-60%. Minimal MR. Mild concentric LVH. Endocardium NWV; grossly nroaml LVSF. Beats of ectopy noted.   Labs 3/20/23: Na 140, K 4.5, Cr 0.94, Ca 9.5, AST 30, ALT 47, Trigs 137, HDL 37, LDL 65, Chol 129, TSH 1.78,k A1C 7.7, PSA 0.53, WBC 8.46, Hgb 14.7, HCT 44.3, [id number]

## 2024-10-03 ENCOUNTER — NON-APPOINTMENT (OUTPATIENT)
Age: 64
End: 2024-10-03

## 2024-10-04 ENCOUNTER — APPOINTMENT (OUTPATIENT)
Dept: CARDIOLOGY | Facility: CLINIC | Age: 64
End: 2024-10-04
Payer: COMMERCIAL

## 2024-10-04 ENCOUNTER — NON-APPOINTMENT (OUTPATIENT)
Age: 64
End: 2024-10-04

## 2024-10-04 VITALS
HEIGHT: 69 IN | BODY MASS INDEX: 42.65 KG/M2 | HEART RATE: 80 BPM | OXYGEN SATURATION: 98 % | SYSTOLIC BLOOD PRESSURE: 124 MMHG | WEIGHT: 288 LBS | DIASTOLIC BLOOD PRESSURE: 78 MMHG

## 2024-10-04 DIAGNOSIS — R06.09 OTHER FORMS OF DYSPNEA: ICD-10-CM

## 2024-10-04 DIAGNOSIS — I25.10 ATHEROSCLEROTIC HEART DISEASE OF NATIVE CORONARY ARTERY W/OUT ANGINA PECTORIS: ICD-10-CM

## 2024-10-04 DIAGNOSIS — E11.9 TYPE 2 DIABETES MELLITUS W/OUT COMPLICATIONS: ICD-10-CM

## 2024-10-04 DIAGNOSIS — E66.01 MORBID (SEVERE) OBESITY DUE TO EXCESS CALORIES: ICD-10-CM

## 2024-10-04 DIAGNOSIS — I10 ESSENTIAL (PRIMARY) HYPERTENSION: ICD-10-CM

## 2024-10-04 DIAGNOSIS — G47.33 OBSTRUCTIVE SLEEP APNEA (ADULT) (PEDIATRIC): ICD-10-CM

## 2024-10-04 DIAGNOSIS — E78.5 HYPERLIPIDEMIA, UNSPECIFIED: ICD-10-CM

## 2024-10-04 DIAGNOSIS — Z00.00 ENCOUNTER FOR GENERAL ADULT MEDICAL EXAMINATION W/OUT ABNORMAL FINDINGS: ICD-10-CM

## 2024-10-04 PROCEDURE — 99214 OFFICE O/P EST MOD 30 MIN: CPT

## 2024-10-04 PROCEDURE — G2211 COMPLEX E/M VISIT ADD ON: CPT | Mod: NC

## 2024-10-04 PROCEDURE — 93000 ELECTROCARDIOGRAM COMPLETE: CPT

## 2024-10-04 NOTE — PHYSICAL EXAM
[Well Developed] : well developed [Obese] : obese [Normal S1, S2] : normal S1, S2 [Clear Lung Fields] : clear lung fields [Normal Gait] : normal gait [Moves all extremities] : moves all extremities [No Focal Deficits] : no focal deficits [Alert and Oriented] : alert and oriented

## 2024-10-04 NOTE — CARDIOLOGY SUMMARY
[de-identified] : 2023 Nuke: Negative EKGs, Probably normal SPECT: small-sized, mild defect(s) in the apical inferior wall that is fixed, predominantly corrects with prone imaging suggestive of diaphragmatic attenuation artifact. [de-identified] : 2023 EF 55-60%, mild MR, PASP 16mmHg.

## 2024-10-04 NOTE — HISTORY OF PRESENT ILLNESS
[FreeTextEntry1] : Danielito is a pleasant 63yoM with PMHx: HTN, HLD, morbid obesity, DM, CARLITO on CPAP and nonobstructive CAD.   Pt reports feeling well since last visit. He has lost a total of 17lbs since last year. BP well controlled at 128/64.   He is active with regular home maintenance and walking; he rides and works on motorcycles. He no longer has any chest discomfort. He has no BRADSHAW/SOB. No palpitations, lightheadedness or dizziness. No near syncope or syncope.  CT Coronaries 2022 showed <50% LAD and plaque of LM. Nuclear stress testing 2022 did not show any ischemia or significant fixed defects. Cath was not pursued. Asymptomatic for CAD.   EKG today shows SB, 1st degree AV block, . on Metoprolol.  Asymptomatic.   He has not had follow up lab work, ordered and requisition given to patient.